# Patient Record
Sex: MALE | Race: WHITE | NOT HISPANIC OR LATINO | Employment: FULL TIME | URBAN - NONMETROPOLITAN AREA
[De-identification: names, ages, dates, MRNs, and addresses within clinical notes are randomized per-mention and may not be internally consistent; named-entity substitution may affect disease eponyms.]

---

## 2023-02-13 ENCOUNTER — OFFICE VISIT (OUTPATIENT)
Dept: FAMILY MEDICINE CLINIC | Facility: CLINIC | Age: 48
End: 2023-02-13
Payer: COMMERCIAL

## 2023-02-13 VITALS
BODY MASS INDEX: 26.4 KG/M2 | HEIGHT: 73 IN | OXYGEN SATURATION: 100 % | WEIGHT: 199.2 LBS | HEART RATE: 75 BPM | DIASTOLIC BLOOD PRESSURE: 82 MMHG | SYSTOLIC BLOOD PRESSURE: 130 MMHG

## 2023-02-13 DIAGNOSIS — Z00.00 ROUTINE GENERAL MEDICAL EXAMINATION AT A HEALTH CARE FACILITY: Primary | ICD-10-CM

## 2023-02-13 DIAGNOSIS — Z13.220 LIPID SCREENING: ICD-10-CM

## 2023-02-13 DIAGNOSIS — M25.571 ARTHRALGIA OF RIGHT FOOT: ICD-10-CM

## 2023-02-13 DIAGNOSIS — Z12.11 COLON CANCER SCREENING: ICD-10-CM

## 2023-02-13 DIAGNOSIS — Z12.5 PROSTATE CANCER SCREENING: ICD-10-CM

## 2023-02-13 PROCEDURE — 99386 PREV VISIT NEW AGE 40-64: CPT | Performed by: FAMILY MEDICINE

## 2023-02-13 PROCEDURE — 36415 COLL VENOUS BLD VENIPUNCTURE: CPT | Performed by: FAMILY MEDICINE

## 2023-02-13 NOTE — PROGRESS NOTES
"Chief Complaint  right toe pain and Annual Exam    Subjective          Josue Nieto presents to [facility]  History of Present Illness  Patient comes in today to establish care he needs a physical scan done he would like to have some blood work done also to get a colonoscopy and PSA as well.  He does report that he believes his grandfather had gout he says he been having periodic pain in his right great toe he says it comes and goes he says its not associated that it does not hurt him at night it is mostly when he moves his foot flexes his foot when he walks great distances start hurting he says it feels swollen and stiff at times and he says otherwise he has no other real shortness of breath chest pains or other difficulties      Objective   Vital Signs:   /82   Pulse 75   Ht 185.4 cm (73\")   Wt 90.4 kg (199 lb 3.2 oz)   SpO2 100%   BMI 26.28 kg/m²     Body mass index is 26.28 kg/m².    Review of Systems   Constitutional: Negative.    HENT: Negative for congestion, dental problem, ear discharge, ear pain and sore throat.    Respiratory: Negative for apnea, chest tightness and shortness of breath.    Gastrointestinal: Negative for constipation and nausea.   Endocrine: Negative for polyuria.   Genitourinary: Negative for difficulty urinating.   Musculoskeletal: Positive for arthralgias. Negative for gait problem.   Skin: Negative for rash.   Hematological: Negative for adenopathy.       Past History:  Medical History: has no past medical history on file.   Surgical History: has no past surgical history on file.       No current outpatient medications on file.    Allergies: Patient has no known allergies.    Physical Exam  Vitals reviewed.   Constitutional:       Appearance: Normal appearance.   HENT:      Head: Normocephalic.      Right Ear: Tympanic membrane, ear canal and external ear normal.      Left Ear: Tympanic membrane, ear canal and external ear normal.      Nose: " Nose normal.      Mouth/Throat:      Pharynx: Oropharynx is clear.   Eyes:      Pupils: Pupils are equal, round, and reactive to light.   Cardiovascular:      Rate and Rhythm: Normal rate and regular rhythm.      Pulses: Normal pulses.   Pulmonary:      Effort: Pulmonary effort is normal.      Breath sounds: Normal breath sounds.   Abdominal:      General: Abdomen is flat. Bowel sounds are normal.      Palpations: Abdomen is soft.   Musculoskeletal:         General: Normal range of motion.      Comments: Stiffness and slightly limited range of motion of right great toe   Skin:     General: Skin is warm and dry.   Neurological:      General: No focal deficit present.      Mental Status: He is alert and oriented to person, place, and time.          Result Review :                   Assessment and Plan    Diagnoses and all orders for this visit:    1. Routine general medical examination at a health care facility (Primary)  Comments:  Get blood work discussed diet and exercise  Orders:  -     CBC & Differential; Future  -     Comprehensive Metabolic Panel; Future  -     Comprehensive Metabolic Panel  -     CBC & Differential    2. Prostate cancer screening  Comments:  We will get PSA  Orders:  -     PSA Screen; Future  -     PSA Screen    3. Lipid screening  Comments:  We will get lipids  Orders:  -     Lipid Panel; Future  -     Lipid Panel    4. Colon cancer screening  Comments:  We will arrange colonoscopy  Orders:  -     Ambulatory Referral to Gastroenterology    5. Arthralgia of right foot  Comments:  We will get uric acid level discussed x-rays and shoe padding  Orders:  -     Uric Acid; Future  -     Uric Acid            Updated annual wellness visit checklist.  Immunizations discussed.  Screening up-to-date.  Recommend yearly dental and eye exams. Also discussed monitoring of blood pressure and lipids.    Follow Up   No follow-ups on file.  Patient was given instructions and counseling regarding his condition or  for health maintenance advice. Please see specific information pulled into the AVS if appropriate.     Geoffrey Murcia MD

## 2023-02-14 DIAGNOSIS — R79.89 ELEVATED LIVER FUNCTION TESTS: Primary | ICD-10-CM

## 2023-02-14 DIAGNOSIS — D69.6 THROMBOCYTOPENIA: ICD-10-CM

## 2023-02-14 LAB
ALBUMIN SERPL-MCNC: 4.8 G/DL (ref 4–5)
ALBUMIN/GLOB SERPL: 3 {RATIO} (ref 1.2–2.2)
ALP SERPL-CCNC: 66 IU/L (ref 44–121)
ALT SERPL-CCNC: 66 IU/L (ref 0–44)
AST SERPL-CCNC: 15 IU/L (ref 0–40)
BASOPHILS # BLD AUTO: 0 X10E3/UL (ref 0–0.2)
BASOPHILS NFR BLD AUTO: 1 %
BILIRUB SERPL-MCNC: 0.5 MG/DL (ref 0–1.2)
BUN SERPL-MCNC: 19 MG/DL (ref 6–24)
BUN/CREAT SERPL: 18 (ref 9–20)
CALCIUM SERPL-MCNC: 9.4 MG/DL (ref 8.7–10.2)
CHLORIDE SERPL-SCNC: 105 MMOL/L (ref 96–106)
CHOLEST SERPL-MCNC: 214 MG/DL (ref 100–199)
CO2 SERPL-SCNC: 24 MMOL/L (ref 20–29)
CREAT SERPL-MCNC: 1.03 MG/DL (ref 0.76–1.27)
EGFRCR SERPLBLD CKD-EPI 2021: 89 ML/MIN/1.73
EOSINOPHIL # BLD AUTO: 0.1 X10E3/UL (ref 0–0.4)
EOSINOPHIL NFR BLD AUTO: 2 %
ERYTHROCYTE [DISTWIDTH] IN BLOOD BY AUTOMATED COUNT: 12.5 % (ref 11.6–15.4)
GLOBULIN SER CALC-MCNC: 1.6 G/DL (ref 1.5–4.5)
GLUCOSE SERPL-MCNC: 74 MG/DL (ref 70–99)
HCT VFR BLD AUTO: 46 % (ref 37.5–51)
HDLC SERPL-MCNC: 32 MG/DL
HGB BLD-MCNC: 16.2 G/DL (ref 13–17.7)
IMM GRANULOCYTES # BLD AUTO: 0 X10E3/UL (ref 0–0.1)
IMM GRANULOCYTES NFR BLD AUTO: 0 %
LDLC SERPL CALC-MCNC: 115 MG/DL (ref 0–99)
LYMPHOCYTES # BLD AUTO: 1.4 X10E3/UL (ref 0.7–3.1)
LYMPHOCYTES NFR BLD AUTO: 29 %
MCH RBC QN AUTO: 31.6 PG (ref 26.6–33)
MCHC RBC AUTO-ENTMCNC: 35.2 G/DL (ref 31.5–35.7)
MCV RBC AUTO: 90 FL (ref 79–97)
MONOCYTES # BLD AUTO: 0.6 X10E3/UL (ref 0.1–0.9)
MONOCYTES NFR BLD AUTO: 11 %
NEUTROPHILS # BLD AUTO: 2.9 X10E3/UL (ref 1.4–7)
NEUTROPHILS NFR BLD AUTO: 57 %
PLATELET # BLD AUTO: 129 X10E3/UL (ref 150–450)
POTASSIUM SERPL-SCNC: 4 MMOL/L (ref 3.5–5.2)
PROT SERPL-MCNC: 6.4 G/DL (ref 6–8.5)
PSA SERPL-MCNC: 0.4 NG/ML (ref 0–4)
RBC # BLD AUTO: 5.13 X10E6/UL (ref 4.14–5.8)
SODIUM SERPL-SCNC: 141 MMOL/L (ref 134–144)
TRIGL SERPL-MCNC: 384 MG/DL (ref 0–149)
URATE SERPL-MCNC: 7 MG/DL (ref 3.8–8.4)
VLDLC SERPL CALC-MCNC: 67 MG/DL (ref 5–40)
WBC # BLD AUTO: 5 X10E3/UL (ref 3.4–10.8)

## 2023-03-06 RX ORDER — SODIUM, POTASSIUM,MAG SULFATES 17.5-3.13G
SOLUTION, RECONSTITUTED, ORAL ORAL
Qty: 354 ML | Refills: 0 | Status: SHIPPED | OUTPATIENT
Start: 2023-03-06

## 2023-03-20 ENCOUNTER — OUTSIDE FACILITY SERVICE (OUTPATIENT)
Dept: GASTROENTEROLOGY | Facility: CLINIC | Age: 48
End: 2023-03-20
Payer: COMMERCIAL

## 2023-03-20 PROCEDURE — 45385 COLONOSCOPY W/LESION REMOVAL: CPT | Performed by: INTERNAL MEDICINE

## 2023-05-16 ENCOUNTER — LAB (OUTPATIENT)
Dept: FAMILY MEDICINE CLINIC | Facility: CLINIC | Age: 48
End: 2023-05-16
Payer: COMMERCIAL

## 2023-05-16 DIAGNOSIS — D69.6 THROMBOCYTOPENIA: ICD-10-CM

## 2023-05-16 DIAGNOSIS — R79.89 ELEVATED LIVER FUNCTION TESTS: ICD-10-CM

## 2023-05-17 LAB
ALBUMIN SERPL-MCNC: 4.8 G/DL (ref 4–5)
ALBUMIN/GLOB SERPL: 2.7 {RATIO} (ref 1.2–2.2)
ALP SERPL-CCNC: 58 IU/L (ref 44–121)
ALT SERPL-CCNC: 43 IU/L (ref 0–44)
AST SERPL-CCNC: 10 IU/L (ref 0–40)
BASOPHILS # BLD AUTO: 0 X10E3/UL (ref 0–0.2)
BASOPHILS NFR BLD AUTO: 1 %
BILIRUB SERPL-MCNC: 0.5 MG/DL (ref 0–1.2)
BUN SERPL-MCNC: 12 MG/DL (ref 6–24)
BUN/CREAT SERPL: 12 (ref 9–20)
CALCIUM SERPL-MCNC: 9.6 MG/DL (ref 8.7–10.2)
CHLORIDE SERPL-SCNC: 105 MMOL/L (ref 96–106)
CO2 SERPL-SCNC: 24 MMOL/L (ref 20–29)
CREAT SERPL-MCNC: 1.02 MG/DL (ref 0.76–1.27)
EGFRCR SERPLBLD CKD-EPI 2021: 90 ML/MIN/1.73
EOSINOPHIL # BLD AUTO: 0.1 X10E3/UL (ref 0–0.4)
EOSINOPHIL NFR BLD AUTO: 3 %
ERYTHROCYTE [DISTWIDTH] IN BLOOD BY AUTOMATED COUNT: 12.6 % (ref 11.6–15.4)
GLOBULIN SER CALC-MCNC: 1.8 G/DL (ref 1.5–4.5)
GLUCOSE SERPL-MCNC: 85 MG/DL (ref 70–99)
HCT VFR BLD AUTO: 45.8 % (ref 37.5–51)
HGB BLD-MCNC: 15.6 G/DL (ref 13–17.7)
IMM GRANULOCYTES # BLD AUTO: 0 X10E3/UL (ref 0–0.1)
IMM GRANULOCYTES NFR BLD AUTO: 0 %
LYMPHOCYTES # BLD AUTO: 1.5 X10E3/UL (ref 0.7–3.1)
LYMPHOCYTES NFR BLD AUTO: 35 %
MCH RBC QN AUTO: 30.9 PG (ref 26.6–33)
MCHC RBC AUTO-ENTMCNC: 34.1 G/DL (ref 31.5–35.7)
MCV RBC AUTO: 91 FL (ref 79–97)
MONOCYTES # BLD AUTO: 0.4 X10E3/UL (ref 0.1–0.9)
MONOCYTES NFR BLD AUTO: 10 %
NEUTROPHILS # BLD AUTO: 2.2 X10E3/UL (ref 1.4–7)
NEUTROPHILS NFR BLD AUTO: 51 %
PLATELET # BLD AUTO: 132 X10E3/UL (ref 150–450)
POTASSIUM SERPL-SCNC: 4.4 MMOL/L (ref 3.5–5.2)
PROT SERPL-MCNC: 6.6 G/DL (ref 6–8.5)
RBC # BLD AUTO: 5.05 X10E6/UL (ref 4.14–5.8)
SODIUM SERPL-SCNC: 142 MMOL/L (ref 134–144)
WBC # BLD AUTO: 4.3 X10E3/UL (ref 3.4–10.8)

## 2023-05-18 DIAGNOSIS — D69.6 THROMBOCYTOPENIA: Primary | ICD-10-CM

## 2023-09-19 ENCOUNTER — CONSULT (OUTPATIENT)
Dept: ONCOLOGY | Facility: CLINIC | Age: 48
End: 2023-09-19
Payer: COMMERCIAL

## 2023-09-19 VITALS
HEART RATE: 60 BPM | BODY MASS INDEX: 26.11 KG/M2 | WEIGHT: 197 LBS | OXYGEN SATURATION: 99 % | DIASTOLIC BLOOD PRESSURE: 90 MMHG | HEIGHT: 73 IN | TEMPERATURE: 99 F | SYSTOLIC BLOOD PRESSURE: 147 MMHG | RESPIRATION RATE: 18 BRPM

## 2023-09-19 DIAGNOSIS — D69.6 THROMBOCYTOPENIA: Primary | ICD-10-CM

## 2023-09-19 PROCEDURE — 99204 OFFICE O/P NEW MOD 45 MIN: CPT | Performed by: INTERNAL MEDICINE

## 2023-09-19 NOTE — LETTER
September 19, 2023     Geoffrey Murcia MD  [address] [place] [address]    Patient: Josue Nieto   YOB: 1975   Date of Visit: 9/19/2023       Dear Geoffrey Murcia MD    Josue Nieto was in my office today. Below is a copy of my note.    If you have questions, please do not hesitate to call me. I look forward to following Josue along with you.         Sincerely,        Shreyas Zaidi MD        CC: No Recipients    CHIEF COMPLAINT: Isolated thrombocytopenia    REASON FOR REFERRAL: Isolated thrombocytopenia      RECORDS OBTAINED  Records of the patients history including those obtained from primary care were reviewed and summarized in detail.    Oncology/Hematology History Overview Note   1.  Thrombocytopenia    Hematology history timeline:  -2/13/2023 platelet count 129,000 otherwise unremarkable CBC and differential  -3/20/2023 colonoscopy showing tubular adenomatous polyp in sigmoid colon x3 with some high-grade dysplasia and 1 of those but no invasion identified.  -5/16/2023 platelets 132,000 otherwise unremarkable CBC and differential.    -9/19/2023 [facility] hematology consult: Patient with asymptomatic coincidental mild isolated thrombocytopenia with no associated viral prodromal symptoms and no associated autoimmune stigmata.  No petechiae or ecchymoses.   No unexplained fevers or chills.  Hemostatically stable with trauma of life.  No excessive bleeding at the time of polyp removal.  While I will do DIC panel, odds of that is low in this fit appearing male.  No palpable cervical axillary or inguinal adenopathy.  No hepatosplenomegaly.  No rashes.  No synovitis.  Could have pseudothrombocytopenia and we will check his peripheral smear and CBC with citrated tube.  Assuming all that is unremarkable and his creatinine and liver enzymes are normal, then the odds are that this is low level ITP for which he would need no treatment unless the platelets drop below 30,000.  In the absence of  "autoimmune stigmata I do not generally check for connective tissue disease serologies.     Thrombocytopenia       HISTORY OF PRESENT ILLNESS:  The patient is a 48 y.o.  male, referred for isolated thrombocytopenia without autoimmune stigmata and no evidence is clinically of hemostatic dysfunction    REVIEW OF SYSTEMS:  Other than some pain in his feet when he walks long distances no significant complaints    History reviewed. No pertinent past medical history.  No past surgical history on file.    Current Outpatient Medications on File Prior to Visit   Medication Sig Dispense Refill   • [DISCONTINUED] sodium-potassium-magnesium sulfates (Suprep Bowel Prep Kit) 17.5-3.13-1.6 GM/177ML solution oral solution Use as directed by provider for colonoscopy prep 354 mL 0     No current facility-administered medications on file prior to visit.       No Known Allergies    Social History     Socioeconomic History   • Marital status:    Tobacco Use   • Smoking status: Never   • Smokeless tobacco: Never   Substance and Sexual Activity   • Alcohol use: Yes   • Drug use: Never   • Sexual activity: Defer       Family History   Problem Relation Age of Onset   • Hypertension Mother    • Migraines Father    • Diabetes Paternal Grandmother    • Hyperlipidemia Paternal Grandmother    • Colon cancer Paternal Grandfather        PHYSICAL EXAM:  No palpable cervical axillary or inguinal adenopathy.  No palpable hepatosplenomegaly.  No jaundice or icterus or pallor.    /90   Pulse 60   Temp 99 °F (37.2 °C)   Resp 18   Ht 185.4 cm (73\")   Wt 89.4 kg (197 lb)   SpO2 99%   BMI 25.99 kg/m²     ECOG score: 0           ECOG: (0) Fully Active - Able to Carry On All Pre-disease Performance Without Restriction    Lab Results   Component Value Date    HGB 15.6 05/16/2023    HCT 45.8 05/16/2023    MCV 91 05/16/2023     (L) 05/16/2023    WBC 4.3 05/16/2023    NEUTROABS 2.2 05/16/2023    LYMPHSABS 1.5 05/16/2023    MONOSABS 0.4 " 05/16/2023    EOSABS 0.1 05/16/2023    BASOSABS 0.0 05/16/2023     Lab Results   Component Value Date    GLUCOSE 85 05/16/2023    BUN 12 05/16/2023    CREATININE 1.02 05/16/2023     05/16/2023    K 4.4 05/16/2023     05/16/2023    CO2 24 05/16/2023    CALCIUM 9.6 05/16/2023    ALBUMIN 4.8 05/16/2023    BILITOT 0.5 05/16/2023    ALKPHOS 58 05/16/2023    AST 10 05/16/2023    ALT 43 05/16/2023         Assessment & Plan     1.  Thrombocytopenia    Hematology history timeline:  -2/13/2023 platelet count 129,000 otherwise unremarkable CBC and differential  -3/20/2023 colonoscopy showing tubular adenomatous polyp in sigmoid colon x3 with some high-grade dysplasia and 1 of those but no invasion identified.  -5/16/2023 platelets 132,000 otherwise unremarkable CBC and differential.    -9/19/2023 [facility] hematology consult: Patient with asymptomatic coincidental mild isolated thrombocytopenia with no associated viral prodromal symptoms and no associated autoimmune stigmata.  No petechiae or ecchymoses.   No unexplained fevers or chills.  Hemostatically stable with trauma of life.  No excessive bleeding at the time of polyp removal.  While I will do DIC panel, odds of that is low in this fit appearing male.  No palpable cervical axillary or inguinal adenopathy.  No hepatosplenomegaly.  No rashes.  No synovitis.  Could have pseudothrombocytopenia and we will check his peripheral smear and CBC with citrated tube.  Assuming all that is unremarkable and his creatinine and liver enzymes are normal, then the odds are that this is low level ITP for which he would need no treatment unless the platelets drop below 30,000.  In the absence of autoimmune stigmata I do not generally check for connective tissue disease serologies.  He should not be at hemostatic risk at this level.    Total time of care today inclusive of time spent today prior to patient's arrival reviewing past available hematologic data and during visit going  over the broad differential diagnosis of thrombocytopenia and the work-up thereof and after visit instituting this plan 45 minutes of patient care time throughout the day today.  Translating that to patient going over the differential of isolated thrombocytopenia    Shreyas Zaidi MD    9/19/2023

## 2023-09-19 NOTE — PROGRESS NOTES
CHIEF COMPLAINT: Isolated thrombocytopenia    REASON FOR REFERRAL: Isolated thrombocytopenia      RECORDS OBTAINED  Records of the patients history including those obtained from primary care were reviewed and summarized in detail.    Oncology/Hematology History Overview Note   1.  Thrombocytopenia    Hematology history timeline:  -2/13/2023 platelet count 129,000 otherwise unremarkable CBC and differential  -3/20/2023 colonoscopy showing tubular adenomatous polyp in sigmoid colon x3 with some high-grade dysplasia and 1 of those but no invasion identified.  -5/16/2023 platelets 132,000 otherwise unremarkable CBC and differential.    -9/19/2023 [facility] hematology consult: Patient with asymptomatic coincidental mild isolated thrombocytopenia with no associated viral prodromal symptoms and no associated autoimmune stigmata.  No petechiae or ecchymoses.   No unexplained fevers or chills.  Hemostatically stable with trauma of life.  No excessive bleeding at the time of polyp removal.  While I will do DIC panel, odds of that is low in this fit appearing male.  No palpable cervical axillary or inguinal adenopathy.  No hepatosplenomegaly.  No rashes.  No synovitis.  Could have pseudothrombocytopenia and we will check his peripheral smear and CBC with citrated tube.  Assuming all that is unremarkable and his creatinine and liver enzymes are normal, then the odds are that this is low level ITP for which he would need no treatment unless the platelets drop below 30,000.  In the absence of autoimmune stigmata I do not generally check for connective tissue disease serologies.  He should not be at hemostatic risk at this level. He also has occasional dyspepsia and I asked him to talk to Dr. Beach about doing EGD which not only would screen for Mendoza's esophagus but could find H. pylori which, when treated, can sometimes normalize the platelet count.     Thrombocytopenia       HISTORY OF PRESENT ILLNESS:  The patient is a 48 y.o.  " male, referred for isolated thrombocytopenia without autoimmune stigmata and no evidence is clinically of hemostatic dysfunction    REVIEW OF SYSTEMS:  Other than some pain in his feet when he walks long distances no significant complaints    History reviewed. No pertinent past medical history.  No past surgical history on file.    Current Outpatient Medications on File Prior to Visit   Medication Sig Dispense Refill    [DISCONTINUED] sodium-potassium-magnesium sulfates (Suprep Bowel Prep Kit) 17.5-3.13-1.6 GM/177ML solution oral solution Use as directed by provider for colonoscopy prep 354 mL 0     No current facility-administered medications on file prior to visit.       No Known Allergies    Social History     Socioeconomic History    Marital status:    Tobacco Use    Smoking status: Never    Smokeless tobacco: Never   Substance and Sexual Activity    Alcohol use: Yes    Drug use: Never    Sexual activity: Defer       Family History   Problem Relation Age of Onset    Hypertension Mother     Migraines Father     Diabetes Paternal Grandmother     Hyperlipidemia Paternal Grandmother     Colon cancer Paternal Grandfather        PHYSICAL EXAM:  No palpable cervical axillary or inguinal adenopathy.  No palpable hepatosplenomegaly.  No jaundice or icterus or pallor.    /90   Pulse 60   Temp 99 °F (37.2 °C)   Resp 18   Ht 185.4 cm (73\")   Wt 89.4 kg (197 lb)   SpO2 99%   BMI 25.99 kg/m²     ECOG score: 0           ECOG: (0) Fully Active - Able to Carry On All Pre-disease Performance Without Restriction    Lab Results   Component Value Date    HGB 15.6 05/16/2023    HCT 45.8 05/16/2023    MCV 91 05/16/2023     (L) 05/16/2023    WBC 4.3 05/16/2023    NEUTROABS 2.2 05/16/2023    LYMPHSABS 1.5 05/16/2023    MONOSABS 0.4 05/16/2023    EOSABS 0.1 05/16/2023    BASOSABS 0.0 05/16/2023     Lab Results   Component Value Date    GLUCOSE 85 05/16/2023    BUN 12 05/16/2023    CREATININE 1.02 05/16/2023    "  05/16/2023    K 4.4 05/16/2023     05/16/2023    CO2 24 05/16/2023    CALCIUM 9.6 05/16/2023    ALBUMIN 4.8 05/16/2023    BILITOT 0.5 05/16/2023    ALKPHOS 58 05/16/2023    AST 10 05/16/2023    ALT 43 05/16/2023         Assessment & Plan     1.  Thrombocytopenia    Hematology history timeline:  -2/13/2023 platelet count 129,000 otherwise unremarkable CBC and differential  -3/20/2023 colonoscopy showing tubular adenomatous polyp in sigmoid colon x3 with some high-grade dysplasia and 1 of those but no invasion identified.  -5/16/2023 platelets 132,000 otherwise unremarkable CBC and differential.    -9/19/2023 [facility] consult: Patient with asymptomatic coincidental mild isolated thrombocytopenia with no associated viral prodromal symptoms and no associated autoimmune stigmata.  No petechiae or ecchymoses.   No unexplained fevers or chills.  Hemostatically stable with trauma of life.  No excessive bleeding at the time of polyp removal.  While I will do DIC panel, odds of that is low in this fit appearing male.  No palpable cervical axillary or inguinal adenopathy.  No hepatosplenomegaly.  No rashes.  No synovitis.  Could have pseudothrombocytopenia and we will check his peripheral smear and CBC with citrated tube.  Assuming all that is unremarkable and his creatinine and liver enzymes are normal, then the odds are that this is low level ITP for which he would need no treatment unless the platelets drop below 30,000.  In the absence of autoimmune stigmata I do not generally check for connective tissue disease serologies.  He should not be at hemostatic risk at this level.  He also has occasional dyspepsia and I asked him to talk to Dr. Beach about doing EGD which not only would screen for Mendoza's esophagus but could find H. pylori which, when treated, can sometimes normalize the platelet count.    Total time of care today inclusive of time spent today prior to patient's arrival reviewing past  available hematologic data and during visit going over the broad differential diagnosis of thrombocytopenia and the work-up thereof and after visit instituting this plan 45 minutes of patient care time throughout the day today.  Translating that to patient going over the differential of isolated thrombocytopenia    Shreyas Zaidi MD    9/19/2023

## 2023-10-24 ENCOUNTER — OFFICE VISIT (OUTPATIENT)
Dept: ONCOLOGY | Facility: CLINIC | Age: 48
End: 2023-10-24
Payer: COMMERCIAL

## 2023-10-24 VITALS
RESPIRATION RATE: 16 BRPM | DIASTOLIC BLOOD PRESSURE: 87 MMHG | HEART RATE: 60 BPM | BODY MASS INDEX: 26.77 KG/M2 | SYSTOLIC BLOOD PRESSURE: 138 MMHG | TEMPERATURE: 97.8 F | WEIGHT: 202 LBS | OXYGEN SATURATION: 98 % | HEIGHT: 73 IN

## 2023-10-24 DIAGNOSIS — D69.6 THROMBOCYTOPENIA: Primary | ICD-10-CM

## 2023-10-24 PROCEDURE — 99214 OFFICE O/P EST MOD 30 MIN: CPT | Performed by: INTERNAL MEDICINE

## 2023-10-24 NOTE — PROGRESS NOTES
CHIEF COMPLAINT: Mild thrombocytopenia    Problem List:  Oncology/Hematology History Overview Note   1.  Thrombocytopenia    Hematology history timeline:  -2/13/2023 platelet count 129,000 otherwise unremarkable CBC and differential  -3/20/2023 colonoscopy showing tubular adenomatous polyp in sigmoid colon x3 with some high-grade dysplasia and 1 of those but no invasion identified.  -5/16/2023 platelets 132,000 otherwise unremarkable CBC and differential.    -9/19/2023 [facility]ology consult: Patient with asymptomatic coincidental mild isolated thrombocytopenia with no associated viral prodromal symptoms and no associated autoimmune stigmata.  No petechiae or ecchymoses.   No unexplained fevers or chills.  Hemostatically stable with trauma of life.  No excessive bleeding at the time of polyp removal.  While I will do DIC panel, odds of that is low in this fit appearing male.  No palpable cervical axillary or inguinal adenopathy.  No hepatosplenomegaly.  No rashes.  No synovitis.  Could have pseudothrombocytopenia and we will check his peripheral smear and CBC with citrated tube.  Assuming all that is unremarkable and his creatinine and liver enzymes are normal, then the odds are that this is low level ITP for which he would need no treatment unless the platelets drop below 30,000.  In the absence of autoimmune stigmata I do not generally check for connective tissue disease serologies.  He should not be at hemostatic risk at this level. He also has occasional dyspepsia and I asked him to talk to Dr. Beach about doing EGD which not only would screen for Mendoza's esophagus but could find H. pylori which, when treated, can sometimes normalize the platelet count.     Thrombocytopenia       HISTORY OF PRESENT ILLNESS:  The patient is a 48 y.o. male, here for follow up on management of mild thrombocytopenia    History reviewed. No pertinent past medical history.  History reviewed. No pertinent surgical history.    No  "Known Allergies    Family History and Social History reviewed and changed as necessary    REVIEW OF SYSTEM:   No somatic complaints    PHYSICAL EXAM:  No petechiae or ecchymoses    Vitals:    10/24/23 1150   BP: 138/87   Pulse: 60   Resp: 16   Temp: 97.8 °F (36.6 °C)   SpO2: 98%   Weight: 91.6 kg (202 lb)   Height: 185.4 cm (73\")     Vitals:    10/24/23 1150   PainSc: 0-No pain          ECOG score: 0           Vitals reviewed.    ECOG: (0) Fully Active - Able to Carry On All Pre-disease Performance Without Restriction    Lab Results   Component Value Date    HGB 15.6 05/16/2023    HCT 45.8 05/16/2023    MCV 91 05/16/2023     (L) 05/16/2023    WBC 4.3 05/16/2023    NEUTROABS 2.2 05/16/2023    LYMPHSABS 1.5 05/16/2023    MONOSABS 0.4 05/16/2023    EOSABS 0.1 05/16/2023    BASOSABS 0.0 05/16/2023       Lab Results   Component Value Date    GLUCOSE 85 05/16/2023    BUN 12 05/16/2023    CREATININE 1.02 05/16/2023     05/16/2023    K 4.4 05/16/2023     05/16/2023    CO2 24 05/16/2023    CALCIUM 9.6 05/16/2023    ALBUMIN 4.8 05/16/2023    BILITOT 0.5 05/16/2023    ALKPHOS 58 05/16/2023    AST 10 05/16/2023    ALT 43 05/16/2023             ASSESSMENT & PLAN:  1.  Very mild thrombocytopenia: I reviewed his labs from September.  His platelets were 147,000 which were essentially normal and his D-dimer and fibrin split products and fibrinogen were all normal and he does not have DIC.  He had no platelet clumping I do not think this is pseudothrombocytopenia.  He may have very low level ITP and I will check his CBC in 3 months and just prior to return to my nurse practitioner in 6 months but if the platelets are staying well over 100,000 I would not need to follow him but would just ask Dr. Murcia to check blood count a couple times a year and as long as his platelets are staying over 30,000 I certainly would not treat with systemic therapies with prednisone etc.  If his platelets drop below 100,000 I be glad " to see him back to monitor but he has no signs or symptoms of other autoimmune disease or other conditions that would be associated with this.  Normal renal and liver functions as well and no microangiopathic changes on peripheral smear and no platelet clumping.  I reviewed all this in detail with the patient at the computer today and total time of care today discussing this was 30 minutes face-to-face  Shreyas Zaidi MD    10/24/2023

## 2024-04-04 ENCOUNTER — TELEPHONE (OUTPATIENT)
Dept: ONCOLOGY | Facility: CLINIC | Age: 49
End: 2024-04-04
Payer: COMMERCIAL

## 2024-04-04 NOTE — TELEPHONE ENCOUNTER
Patient called asking him where he would go to get labs drawn. Patient stating he will go to [facility] Fax #[phone]

## 2024-04-04 NOTE — TELEPHONE ENCOUNTER
Caller: Josue Nieto    Relationship: Self    Best call back number: [phone]    What is the best time to reach you: ANYTIME    Who are you requesting to speak with (clinical staff, provider,  specific staff member): CLINICAL    What was the call regarding: PATIENT HAS MISPLACED HIS LAB ORDERS FOR HIS UPCOMING APPT. PLEASE CALL TO ADVISE HOW HE CAN GET A NEW ORDER.

## 2024-04-05 ENCOUNTER — OFFICE VISIT (OUTPATIENT)
Dept: FAMILY MEDICINE CLINIC | Facility: CLINIC | Age: 49
End: 2024-04-05
Payer: COMMERCIAL

## 2024-04-05 VITALS
HEART RATE: 72 BPM | BODY MASS INDEX: 27.38 KG/M2 | SYSTOLIC BLOOD PRESSURE: 120 MMHG | HEIGHT: 73 IN | DIASTOLIC BLOOD PRESSURE: 82 MMHG | OXYGEN SATURATION: 96 % | WEIGHT: 206.6 LBS

## 2024-04-05 DIAGNOSIS — Z00.00 GENERAL MEDICAL EXAM: Primary | ICD-10-CM

## 2024-04-05 DIAGNOSIS — Z12.5 PROSTATE CANCER SCREENING: ICD-10-CM

## 2024-04-05 DIAGNOSIS — D69.6 THROMBOCYTOPENIA: ICD-10-CM

## 2024-04-05 DIAGNOSIS — Z71.89 ENCOUNTER FOR COUNSELING FOR SLEEP APNEA: ICD-10-CM

## 2024-04-05 DIAGNOSIS — Z11.59 NEED FOR HEPATITIS C SCREENING TEST: ICD-10-CM

## 2024-04-05 DIAGNOSIS — G47.30 ENCOUNTER FOR COUNSELING FOR SLEEP APNEA: ICD-10-CM

## 2024-04-05 DIAGNOSIS — R06.09 EXERTIONAL DYSPNEA: ICD-10-CM

## 2024-04-05 DIAGNOSIS — K21.9 GASTROESOPHAGEAL REFLUX DISEASE WITHOUT ESOPHAGITIS: ICD-10-CM

## 2024-04-05 DIAGNOSIS — E78.2 MIXED HYPERLIPIDEMIA: ICD-10-CM

## 2024-04-05 NOTE — ASSESSMENT & PLAN NOTE
He has had some mild exertional fatigue as well as a family history of high cholesterol and heart problems.  His EKG does not show any significant changes, but I would recommend that he see cardiology for further evaluation.  He is in agreement with this, so we will send a referral.

## 2024-04-05 NOTE — ASSESSMENT & PLAN NOTE
He has had significantly high cholesterol in the past, especially his VLDL.  It is possible that it has a genetic component.  We discussed the importance of low-cholesterol diet and exercise.  We will repeat levels on blood work when fasting, but I recommend that he consider statin therapy.

## 2024-04-05 NOTE — ASSESSMENT & PLAN NOTE
His episodes are not frequent enough that I think we need to start a PPI, but I did advise him to try something over-the-counter like famotidine as needed.  I also encouraged him to avoid eating late at night and to elevate the head of his bed.  Other food triggers attached to AVS.

## 2024-04-05 NOTE — PROGRESS NOTES
Male Physical Note      Date: 2024   Patient Name: Josue Nieto  : 1975   MRN: [id number]     Chief Complaint:    Chief Complaint   Patient presents with    Annual Exam       History of Present Illness: Josue Nieto is a 48 y.o. male who is here today for their annual health maintenance and physical.      He complains of intermittent epigastric pain through to his back every few weeks.  He states that it is not very common, but it usually happens after he has eaten spaghetti with red sauce.  He states that he is thinks it is related to his gallbladder, but he has not necessarily had problems with his gallbladder in the past.  He denies any nausea, vomiting, or diarrhea.  He states that he has taken an acids before, and he seemed to help.    He states that his mother has recently been having test on her heart, and the cardiologist recommended that her children be tested.  He is unsure what specific abnormalities they found.  He denies any chest pain, but he states he does have pressure at times.  He also admits that he has had increased dyspnea on exertion.    He states that his wife also wanted him to be checked for sleep apnea.  He states that she has never witnessed him stop breathing in his sleep, and he has not sure how much she snores.  He does not think that it is consistent, and it is not extremely loud.  He denies any daytime drowsiness, and he states that he usually sleeps fairly well.  He does not wake up frequently, and he has only been awakened from sleep gasping for air 1 time.  He admits that he had been out and had some alcohol prior to that.  He states that that has not happened any other time.    He is currently still being followed by hematology, and his next appointment on 24.  He states that he had blood work done earlier today for them, but they we will likely release him if everything is normal with these labs.    He has a history of abnormal colonoscopy, and his last  one was a year ago in March.  He states that they found multiple polyps, and he is scheduled to go for a repeat colonoscopy on 5/6/2024.    Subjective      Review of Systems:   Review of Systems   Constitutional:  Negative for activity change, appetite change, fatigue, unexpected weight gain and unexpected weight loss.   HENT:  Negative for congestion, ear pain, hearing loss, postnasal drip, rhinorrhea, sinus pressure, sneezing, sore throat, trouble swallowing and voice change.    Eyes:  Negative for pain and visual disturbance.   Respiratory:  Positive for shortness of breath (some increase in dyspnea with exertion). Negative for apnea, cough, chest tightness and wheezing.         (-) Snoring   Cardiovascular:  Negative for chest pain, palpitations and leg swelling.   Gastrointestinal:  Positive for abdominal pain (epigastric pain) and indigestion (heartburn with red sauce). Negative for abdominal distention, blood in stool, constipation, diarrhea, nausea, vomiting and GERD.   Endocrine: Negative for cold intolerance, heat intolerance, polydipsia and polyuria.   Musculoskeletal:  Negative for arthralgias and gait problem.   Skin: Negative.    Allergic/Immunologic: Negative for environmental allergies and food allergies.   Neurological:  Negative for dizziness, syncope, weakness, light-headedness, numbness, headache and confusion.   Hematological:  Negative for adenopathy. Does not bruise/bleed easily.   Psychiatric/Behavioral:  Negative for sleep disturbance.        Past Medical History, Social History, Family History and Care Team were all reviewed with patient and updated as appropriate.     Medications:   No current outpatient medications on file.    Allergies:   No Known Allergies    Health Maintenance   Topic Date Due    HEPATITIS C SCREENING  Never done    ZOSTER VACCINE (1 of 2) Never done    ANNUAL PHYSICAL  02/13/2024    LIPID PANEL  02/13/2024    BMI FOLLOWUP  04/05/2024 (Originally 1975)     "COLORECTAL CANCER SCREENING  05/06/2024 (Originally 1975)    INFLUENZA VACCINE  08/01/2024    TDAP/TD VACCINES (2 - Td or Tdap) 09/13/2028    COVID-19 Vaccine  Completed    Pneumococcal Vaccine 0-64  Aged Out        Diet/Physical activity:   He admits eating quite a bit of fast food, especially at lunch. He does not eat much fried food. He does not eat enough fruits and vegetables.    He is not currently exercising formally, but he has been trying to be more active in general.     Sexual health: Denies concerns.     Intimate partner violence: Denies concerns.      Objective     Physical Exam:  Vital Signs:   Vitals:    04/05/24 1109   BP: 120/82   BP Location: Left arm   Patient Position: Sitting   Cuff Size: Adult   Pulse: 72   SpO2: 96%   Weight: 93.7 kg (206 lb 9.6 oz)   Height: 185.4 cm (73\")     Body mass index is 27.26 kg/m².     Physical Exam  Vitals and nursing note reviewed.   Constitutional:       General: He is not in acute distress.     Appearance: Normal appearance. He is not ill-appearing.   HENT:      Head: Normocephalic and atraumatic.      Nose: Nose normal.   Eyes:      Pupils: Pupils are equal, round, and reactive to light.   Cardiovascular:      Rate and Rhythm: Normal rate and regular rhythm.      Pulses: Normal pulses.      Heart sounds: Normal heart sounds.   Pulmonary:      Effort: Pulmonary effort is normal. No respiratory distress.      Breath sounds: Normal breath sounds.   Abdominal:      General: Bowel sounds are normal.      Palpations: Abdomen is soft.      Tenderness: There is no abdominal tenderness. There is no guarding. Negative signs include Eldridge's sign.   Musculoskeletal:      Cervical back: Normal range of motion and neck supple.      Right lower leg: No edema.      Left lower leg: No edema.   Skin:     General: Skin is warm and dry.   Neurological:      General: No focal deficit present.      Mental Status: He is alert and oriented to person, place, and time.      Motor: " No weakness.      Coordination: Coordination normal.   Psychiatric:         Mood and Affect: Mood normal.         Behavior: Behavior normal.     PHQ-2 Depression Screening  Little interest or pleasure in doing things? 0-->not at all   Feeling down, depressed, or hopeless? 0-->not at all   PHQ-2 Total Score 0     Procedure     ECG 12 Lead    Date/Time: 4/5/2024 1:03 PM  Performed by: Sophie Reid PA-C    Authorized by: Sophie Reid PA-C  Comparison: not compared with previous ECG   Previous ECG: no previous ECG available  Rhythm: sinus rhythm  Rate: bradycardic         Assessment / Plan      Assessment/Plan:   Diagnoses and all orders for this visit:    1. General medical exam (Primary)  Comments:  Vaccinations and preventative screenings encouraged.  Orders:  -     Thyroid Cascade Profile; Future  -     Lipid Panel; Future  -     Hemoglobin A1c; Future  -     Comprehensive Metabolic Panel; Future  -     Apolipoprotein B; Future    2. Exertional dyspnea  Assessment & Plan:  He has had some mild exertional fatigue as well as a family history of high cholesterol and heart problems.  His EKG does not show any significant changes, but I would recommend that he see cardiology for further evaluation.  He is in agreement with this, so we will send a referral.    Orders:  -     ECG 12 Lead  -     Ambulatory Referral to Cardiology    3. Mixed hyperlipidemia  Assessment & Plan:  He has had significantly high cholesterol in the past, especially his VLDL.  It is possible that it has a genetic component.  We discussed the importance of low-cholesterol diet and exercise.  We will repeat levels on blood work when fasting, but I recommend that he consider statin therapy.      4. Gastroesophageal reflux disease without esophagitis  Assessment & Plan:  His episodes are not frequent enough that I think we need to start a PPI, but I did advise him to try something over-the-counter like famotidine as  needed.  I also encouraged him to avoid eating late at night and to elevate the head of his bed.  Other food triggers attached to AVS.      5. Thrombocytopenia  Assessment & Plan:  Continue with hematology as directed.      6. Encounter for counseling for sleep apnea  Assessment & Plan:  Given his lack of symptoms, I do think he is low risk for sleep apnea. The episode of gasping for air is likely due to his GERD rather than apnea. We can schedule with specialist for consultation, but he states that he will wait at this time.      7. Need for hepatitis C screening test  -     HCV Antibody Rfx To Qnt PCR; Future    8. Prostate cancer screening  -     PSA Screen; Future        Follow Up:   No follow-ups on file.    Healthcare Maintenance:   Discussed injury prevention, diet and exercise, safe sexual practices, and screening for common diseases. Encouraged use of sunscreen and seatbelts. Discussed timing of colon cancer cancer screening, prostate cancer screening, and review of skin for lesions. Avoidance of tobacco encouraged. Limitation or avoidance of alcohol encouraged. Recommend yearly dental and eye exams. Also discussed monitoring of blood pressure and lipids.   Josue Nieto voices understanding and acceptance of this advice and will call back with any further questions or concerns. AVS with preventive healthcare tips printed for patient.     Sophie Reid PA-C  [facility]

## 2024-04-05 NOTE — ASSESSMENT & PLAN NOTE
Given his lack of symptoms, I do think he is low risk for sleep apnea. The episode of gasping for air is likely due to his GERD rather than apnea. We can schedule with specialist for consultation, but he states that he will wait at this time.

## 2024-04-06 LAB
BASOPHILS # BLD AUTO: 0 X10E3/UL (ref 0–0.2)
BASOPHILS NFR BLD AUTO: 1 %
EOSINOPHIL # BLD AUTO: 0.1 X10E3/UL (ref 0–0.4)
EOSINOPHIL NFR BLD AUTO: 2 %
ERYTHROCYTE [DISTWIDTH] IN BLOOD BY AUTOMATED COUNT: 12.6 % (ref 11.6–15.4)
HCT VFR BLD AUTO: 47.8 % (ref 37.5–51)
HGB BLD-MCNC: 16.5 G/DL (ref 13–17.7)
IMM GRANULOCYTES # BLD AUTO: 0 X10E3/UL (ref 0–0.1)
IMM GRANULOCYTES NFR BLD AUTO: 1 %
LYMPHOCYTES # BLD AUTO: 1.4 X10E3/UL (ref 0.7–3.1)
LYMPHOCYTES NFR BLD AUTO: 34 %
MCH RBC QN AUTO: 31.8 PG (ref 26.6–33)
MCHC RBC AUTO-ENTMCNC: 34.5 G/DL (ref 31.5–35.7)
MCV RBC AUTO: 92 FL (ref 79–97)
MONOCYTES # BLD AUTO: 0.3 X10E3/UL (ref 0.1–0.9)
MONOCYTES NFR BLD AUTO: 8 %
NEUTROPHILS # BLD AUTO: 2.3 X10E3/UL (ref 1.4–7)
NEUTROPHILS NFR BLD AUTO: 54 %
PLATELET # BLD AUTO: 133 X10E3/UL (ref 150–450)
RBC # BLD AUTO: 5.19 X10E6/UL (ref 4.14–5.8)
WBC # BLD AUTO: 4.2 X10E3/UL (ref 3.4–10.8)

## 2024-04-09 ENCOUNTER — LAB (OUTPATIENT)
Dept: FAMILY MEDICINE CLINIC | Facility: CLINIC | Age: 49
End: 2024-04-09
Payer: COMMERCIAL

## 2024-04-09 DIAGNOSIS — Z12.5 PROSTATE CANCER SCREENING: ICD-10-CM

## 2024-04-09 DIAGNOSIS — Z11.59 NEED FOR HEPATITIS C SCREENING TEST: ICD-10-CM

## 2024-04-09 DIAGNOSIS — Z00.00 GENERAL MEDICAL EXAM: ICD-10-CM

## 2024-04-09 PROCEDURE — 36415 COLL VENOUS BLD VENIPUNCTURE: CPT | Performed by: PHYSICIAN ASSISTANT

## 2024-04-10 LAB
ALBUMIN SERPL-MCNC: 4.5 G/DL (ref 3.8–4.9)
ALBUMIN/GLOB SERPL: 2.1 {RATIO} (ref 1.2–2.2)
ALP SERPL-CCNC: 58 IU/L (ref 44–121)
ALT SERPL-CCNC: 44 IU/L (ref 0–44)
AST SERPL-CCNC: 8 IU/L (ref 0–40)
BILIRUB SERPL-MCNC: 0.5 MG/DL (ref 0–1.2)
BUN SERPL-MCNC: 15 MG/DL (ref 6–24)
BUN/CREAT SERPL: 13 (ref 9–20)
CALCIUM SERPL-MCNC: 9.7 MG/DL (ref 8.7–10.2)
CHLORIDE SERPL-SCNC: 105 MMOL/L (ref 96–106)
CHOLEST SERPL-MCNC: 237 MG/DL (ref 100–199)
CO2 SERPL-SCNC: 24 MMOL/L (ref 20–29)
CREAT SERPL-MCNC: 1.14 MG/DL (ref 0.76–1.27)
EGFRCR SERPLBLD CKD-EPI 2021: 78 ML/MIN/1.73
GLOBULIN SER CALC-MCNC: 2.1 G/DL (ref 1.5–4.5)
GLUCOSE SERPL-MCNC: 94 MG/DL (ref 70–99)
HBA1C MFR BLD: 5.1 % (ref 4.8–5.6)
HCV AB SERPL QL IA: NORMAL
HCV IGG SERPL QL IA: NON REACTIVE
HDLC SERPL-MCNC: 39 MG/DL
INTERPRETATION: NORMAL
LDLC SERPL CALC-MCNC: 161 MG/DL (ref 0–99)
POTASSIUM SERPL-SCNC: 4.3 MMOL/L (ref 3.5–5.2)
PROT SERPL-MCNC: 6.6 G/DL (ref 6–8.5)
PSA SERPL-MCNC: 0.3 NG/ML (ref 0–4)
SODIUM SERPL-SCNC: 143 MMOL/L (ref 134–144)
T4 FREE SERPL-MCNC: 1.01 NG/DL (ref 0.82–1.77)
THYROPEROXIDASE AB SERPL-ACNC: 9 IU/ML (ref 0–34)
TRIGL SERPL-MCNC: 202 MG/DL (ref 0–149)
TSH SERPL DL<=0.005 MIU/L-ACNC: 8.03 UIU/ML (ref 0.45–4.5)
VLDLC SERPL CALC-MCNC: 37 MG/DL (ref 5–40)

## 2024-04-11 LAB — APO B SERPL-MCNC: 134 MG/DL

## 2024-04-24 ENCOUNTER — OFFICE VISIT (OUTPATIENT)
Dept: CARDIOLOGY | Facility: CLINIC | Age: 49
End: 2024-04-24
Payer: COMMERCIAL

## 2024-04-24 ENCOUNTER — TELEPHONE (OUTPATIENT)
Dept: CARDIOLOGY | Facility: CLINIC | Age: 49
End: 2024-04-24

## 2024-04-24 VITALS
BODY MASS INDEX: 26.51 KG/M2 | RESPIRATION RATE: 18 BRPM | DIASTOLIC BLOOD PRESSURE: 72 MMHG | HEART RATE: 68 BPM | WEIGHT: 200 LBS | OXYGEN SATURATION: 96 % | SYSTOLIC BLOOD PRESSURE: 108 MMHG | HEIGHT: 73 IN

## 2024-04-24 DIAGNOSIS — E78.2 MIXED HYPERLIPIDEMIA: Primary | ICD-10-CM

## 2024-04-24 DIAGNOSIS — R06.09 EXERTIONAL DYSPNEA: ICD-10-CM

## 2024-04-24 PROCEDURE — 99204 OFFICE O/P NEW MOD 45 MIN: CPT | Performed by: INTERNAL MEDICINE

## 2024-04-24 RX ORDER — ROSUVASTATIN CALCIUM 5 MG/1
5 TABLET, COATED ORAL DAILY
Qty: 90 TABLET | Refills: 3 | Status: SHIPPED | OUTPATIENT
Start: 2024-04-24

## 2024-04-24 RX ORDER — SODIUM, POTASSIUM,MAG SULFATES 17.5-3.13G
SOLUTION, RECONSTITUTED, ORAL ORAL
Qty: 354 ML | Refills: 0 | Status: SHIPPED | OUTPATIENT
Start: 2024-04-24

## 2024-04-24 NOTE — ASSESSMENT & PLAN NOTE
Risk factors: Age, gender, family history of early onset CAD, hyperlipidemia.  Normal EKG.  No angina, possibly anginal equivalent.  Cardiovascular exam normal.   Plan:  Refer patient for a treadmill exercise stress test, and depending on findings and Sen score, consider additional workup (coronary calcium scoring, coronary angiogram, coronary CTA, other)  Start rosuvastatin 5 mg p.o. daily and monitor LFTs  Uptitrate to target LDL less than 70 at follow-up visits

## 2024-04-24 NOTE — PROGRESS NOTES
[facility] [place]  [facility]  [address] [place] [address]  Dept: [phone]  Dept Fax: [phone]    Date: 04/24/2024  Patient: Josue Nieto  YOB: 1975    New Patient Office Note    Consult Reason:  Mr. Josue Nieto is a 49 y.o. male who presents to the clinic to establish care, seen for Shortness of Breath (On exertion).   Patient complaining of dyspnea on exertion taking his trash container up a hill back to his house, no associated chest pain or lightheadedness.  Patient denies angina, orthopnea, PND, palpitations, lightheadedness, syncope or medications side-effects.  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  Works on his yard every day as exercise.  Strong family history of early onset CAD, stroke and need for pacemaker.        The following portions of the patient's history were reviewed and updated as appropriate: allergies, current medications, past family history, past medical history, past social history, past surgical history, and problem list.    Medications: No Known Allergies   Current Outpatient Medications   Medication Instructions    rosuvastatin (CRESTOR) 5 mg, Oral, Daily    sodium-potassium-magnesium sulfates (Suprep Bowel Prep Kit) 17.5-3.13-1.6 GM/177ML solution oral solution Use as directed by provider for colonoscopy prep       Subjective  History reviewed. No pertinent past medical history.    History reviewed. No pertinent surgical history.    Family History   Problem Relation Age of Onset    Hypertension Mother     Hyperlipidemia Mother     Coronary artery disease Mother     Migraines Father     Coronary artery disease Maternal Grandmother     Hyperlipidemia Maternal Grandmother     Colon cancer Maternal Grandfather     Diabetes Paternal Grandmother     Hyperlipidemia Paternal Grandmother     Colon cancer Paternal Grandfather     Heart attack Paternal Grandfather 60    Hyperlipidemia Sister         Social History  "    Socioeconomic History    Marital status:    Tobacco Use    Smoking status: Never    Smokeless tobacco: Never   Vaping Use    Vaping status: Never Used   Substance and Sexual Activity    Alcohol use: Yes    Drug use: Never    Sexual activity: Defer       Objective  Vitals:    04/24/24 0952   BP: 108/72   BP Location: Right arm   Patient Position: Sitting   Cuff Size: Adult   Pulse: 68   Resp: 18   SpO2: 96%   Weight: 90.7 kg (200 lb)   Height: 185.4 cm (73\")   PainSc: 0-No pain     Vitals:    04/24/24 0952   BP: 108/72   BP Location: Right arm   Patient Position: Sitting   Cuff Size: Adult   Pulse: 68   Resp: 18   SpO2: 96%   Weight: 90.7 kg (200 lb)   Height: 185.4 cm (73\")        Physical Exam  Constitutional:       Appearance: Healthy appearance. Not in distress.   Eyes:      Pupils: Pupils are equal, round, and reactive to light.   Neck:      Vascular: No JVR. JVD normal.   Pulmonary:      Effort: Pulmonary effort is normal.      Breath sounds: Normal breath sounds. No wheezing. No rhonchi. No rales.   Chest:      Chest wall: Not tender to palpatation.   Cardiovascular:      PMI at left midclavicular line. Normal rate. Regular rhythm. Normal S1 with normal intensity. Normal S2 with normal intensity.       Murmurs: There is no murmur.      No gallop.  No click. No rub.   Pulses:     Intact distal pulses.   Edema:     Peripheral edema absent.   Abdominal:      General: There is no abdominal bruit.   Skin:     General: Skin is warm.   Neurological:      Mental Status: Alert and oriented to person, place and time.              Labs:  Lab Results   Component Value Date     04/09/2024    K 4.3 04/09/2024     04/09/2024    CO2 24 04/09/2024    BUN 15 04/09/2024    CREATININE 1.14 04/09/2024    CALCIUM 9.7 04/09/2024    BILITOT 0.5 04/09/2024    ALKPHOS 58 04/09/2024    ALT 44 04/09/2024    AST 8 04/09/2024    GLUCOSE 94 04/09/2024    ALBUMIN 4.5 04/09/2024     Lab Results   Component Value Date    " "WBC 4.2 04/05/2024    HGB 16.5 04/05/2024    HCT 47.8 04/05/2024     (L) 04/05/2024     No results found for: \"APTT\", \"INR\", \"PTT\"  No results found for: \"CKTOTAL\", \"TROPONINI\", \"TROPONINT\", \"CKMBINDEX\", \"BNP\"  No results found for: \"BNP\", \"PROBNP\"    Lab Results   Component Value Date    CHLPL 237 (H) 04/09/2024    TRIG 202 (H) 04/09/2024    HDL 39 (L) 04/09/2024     (H) 04/09/2024     Lab Results   Component Value Date    TSH 8.030 (H) 04/09/2024    FREET4 1.01 04/09/2024       The 10-year ASCVD risk score (Perry HUERTAS, et al., 2019) is: 4.6%    Values used to calculate the score:      Age: 49 years      Sex: Male      Is Non- : No      Diabetic: No      Tobacco smoker: No      Systolic Blood Pressure: 108 mmHg      Is BP treated: No      HDL Cholesterol: 39 mg/dL      Total Cholesterol: 237 mg/dL     CV Diagnostics:  Procedures  EKG: EKG: normal sinus rhythm, sinus bradycardia.  CXR: No results found for this or any previous visit.     ECHO/MUGA: No results found for this or any previous visit.     STRESS TESTS: No results found for this or any previous visit.     CARDIAC CATH: No results found for this or any previous visit.     DEVICES: No valid procedures specified.   HOLTER: No results found for this or any previous visit.     CT/MRI:  No results found for this or any previous visit.    VASCULAR: No valid procedures specified.     Assessment and Plan  Diagnoses and all orders for this visit:    1. Mixed hyperlipidemia (Primary)  Assessment & Plan:   Lipid abnormalities are newly identified    Plan:  Begin taking the following medication/s; rosuvastatin 5 mg p.o. daily.      Discussed medication dosage, use, side effects, and goals of treatment in detail.    Counseled patient on lifestyle modifications to help control hyperlipidemia.   Cholesterol lowering dietary information shared with patient.  Advised patient to exercise for 150 minutes weekly. (30 minute brisk walk, 5 " days a week for example)  Weight Loss encouraged  Patient counseled in regards to heart healthy, low fat/ low cholesterol/low sat diet, daily exercise for 30 minutes, low to moderate intensity, and weight loss.    Patient Treatment Goals:   LDL goal is less than 70    Followup in 6 months.      2. Exertional dyspnea  Assessment & Plan:  Risk factors: Age, gender, family history of early onset CAD, hyperlipidemia.  Normal EKG.  No angina, possibly anginal equivalent.  Cardiovascular exam normal.   Plan:  Refer patient for a treadmill exercise stress test, and depending on findings and Sen score, consider additional workup (coronary calcium scoring, coronary angiogram, coronary CTA, other)  Start rosuvastatin 5 mg p.o. daily and monitor LFTs  Uptitrate to target LDL less than 70 at follow-up visits    Orders:  -     Treadmill Stress Test; Future  -     Comprehensive Metabolic Panel; Future    Other orders  -     rosuvastatin (CRESTOR) 5 MG tablet; Take 1 tablet by mouth Daily.  Dispense: 90 tablet; Refill: 3         Return in about 6 months (around 10/24/2024) for Next scheduled follow up, Follow-up with Dr Reyes.    There are no Patient Instructions on file for this visit.    Eddi Reyes MD

## 2024-04-24 NOTE — ASSESSMENT & PLAN NOTE
Lipid abnormalities are newly identified    Plan:  Begin taking the following medication/s; rosuvastatin 5 mg p.o. daily.      Discussed medication dosage, use, side effects, and goals of treatment in detail.    Counseled patient on lifestyle modifications to help control hyperlipidemia.   Cholesterol lowering dietary information shared with patient.  Advised patient to exercise for 150 minutes weekly. (30 minute brisk walk, 5 days a week for example)  Weight Loss encouraged  Patient counseled in regards to heart healthy, low fat/ low cholesterol/low sat diet, daily exercise for 30 minutes, low to moderate intensity, and weight loss.    Patient Treatment Goals:   LDL goal is less than 70    Followup in 6 months.

## 2024-05-01 ENCOUNTER — TELEPHONE (OUTPATIENT)
Dept: CARDIOLOGY | Facility: CLINIC | Age: 49
End: 2024-05-01
Payer: COMMERCIAL

## 2024-05-01 NOTE — TELEPHONE ENCOUNTER
----- Message from Seble PATIÑO sent at 4/30/2024  3:27 PM EDT -----  Please inform patient that his treadmill Stress Test was most likely normal.  He has a very good exercise capacity.  He had no EKG changes in the leads that were normal at baseline.  He had EKG changes in one of the leads that was abnormal at baseline, not suggesting ischemia, more due to baseline changes.  Test specificity limited by above finding.  Medical therapy for now.  If future concerns, can repeat test with imaging or coronary CT angiogram that looks directly at the rest of the heart.  Thank you!

## 2024-05-06 ENCOUNTER — OUTSIDE FACILITY SERVICE (OUTPATIENT)
Dept: GASTROENTEROLOGY | Facility: CLINIC | Age: 49
End: 2024-05-06
Payer: COMMERCIAL

## 2024-05-08 ENCOUNTER — OFFICE VISIT (OUTPATIENT)
Dept: ONCOLOGY | Facility: CLINIC | Age: 49
End: 2024-05-08
Payer: COMMERCIAL

## 2024-05-08 VITALS
RESPIRATION RATE: 18 BRPM | OXYGEN SATURATION: 98 % | HEIGHT: 73 IN | SYSTOLIC BLOOD PRESSURE: 136 MMHG | DIASTOLIC BLOOD PRESSURE: 87 MMHG | WEIGHT: 200 LBS | BODY MASS INDEX: 26.51 KG/M2 | HEART RATE: 64 BPM | TEMPERATURE: 97.3 F

## 2024-05-08 DIAGNOSIS — D69.6 THROMBOCYTOPENIA: Primary | ICD-10-CM

## 2024-05-08 PROCEDURE — 99213 OFFICE O/P EST LOW 20 MIN: CPT | Performed by: NURSE PRACTITIONER

## 2024-06-17 ENCOUNTER — OFFICE VISIT (OUTPATIENT)
Dept: FAMILY MEDICINE CLINIC | Facility: CLINIC | Age: 49
End: 2024-06-17
Payer: COMMERCIAL

## 2024-06-17 VITALS
SYSTOLIC BLOOD PRESSURE: 102 MMHG | OXYGEN SATURATION: 98 % | DIASTOLIC BLOOD PRESSURE: 70 MMHG | HEIGHT: 73 IN | WEIGHT: 198 LBS | HEART RATE: 72 BPM | BODY MASS INDEX: 26.24 KG/M2

## 2024-06-17 DIAGNOSIS — M25.532 ACUTE PAIN OF LEFT WRIST: Primary | ICD-10-CM

## 2024-06-17 PROCEDURE — 99213 OFFICE O/P EST LOW 20 MIN: CPT | Performed by: PHYSICIAN ASSISTANT

## 2024-06-18 NOTE — ASSESSMENT & PLAN NOTE
Provided patient with x-ray this date.  Recommended compression sleeve if x-ray is normal.  Then if no relief after a few weeks then advised patient he likely needs MRI and follow-up with orthopedics.  Provided patient with contact information and he will call and schedule his own appointment.

## 2024-06-18 NOTE — PROGRESS NOTES
"Chief Complaint  Wrist Pain (Left )    Subjective        Josue Nieto presents to [facility]  History of Present Illness  Patient reports today secondary to having left wrist pain for the past month.  Patient states he tripped and broke his fall with his left arm.  Reports since then he has had pain to his wrist.  Patient reports he has not taken any medication so far.  Denies any previous injuries.  Wrist Pain         Objective   Vital Signs:  /70   Pulse 72   Ht 185.4 cm (73\")   Wt 89.8 kg (198 lb)   SpO2 98%   BMI 26.12 kg/m²   Estimated body mass index is 26.12 kg/m² as calculated from the following:    Height as of this encounter: 185.4 cm (73\").    Weight as of this encounter: 89.8 kg (198 lb).             Physical Exam  Vitals and nursing note reviewed.   Constitutional:       General: He is not in acute distress.     Appearance: Normal appearance.   HENT:      Head: Normocephalic.      Right Ear: Hearing normal.      Left Ear: Hearing normal.   Eyes:      Pupils: Pupils are equal, round, and reactive to light.   Cardiovascular:      Rate and Rhythm: Normal rate and regular rhythm.   Pulmonary:      Effort: Pulmonary effort is normal. No respiratory distress.   Musculoskeletal:         General: Tenderness present.      Comments: No visible edema erythema.  Patient tender upon palpation to lateral surface of left wrist   Skin:     General: Skin is warm and dry.   Neurological:      Mental Status: He is alert.   Psychiatric:         Mood and Affect: Mood normal.        Result Review :                     Assessment and Plan     Diagnoses and all orders for this visit:    1. Acute pain of left wrist (Primary)  Assessment & Plan:  Provided patient with x-ray this date.  Recommended compression sleeve if x-ray is normal.  Then if no relief after a few weeks then advised patient he likely needs MRI and follow-up with orthopedics.  Provided patient with contact information " and he will call and schedule his own appointment.    Orders:  -     XR Wrist 3+ View Left; Future             Follow Up     Return if symptoms worsen or fail to improve.  Patient was given instructions and counseling regarding his condition or for health maintenance advice. Please see specific information pulled into the AVS if appropriate.

## 2024-06-19 DIAGNOSIS — M25.532 ACUTE PAIN OF LEFT WRIST: Primary | ICD-10-CM

## 2024-06-19 DIAGNOSIS — R93.6 ABNORMAL X-RAY OF HAND: ICD-10-CM

## 2024-10-24 ENCOUNTER — OFFICE VISIT (OUTPATIENT)
Dept: CARDIOLOGY | Facility: CLINIC | Age: 49
End: 2024-10-24
Payer: COMMERCIAL

## 2024-10-24 VITALS
WEIGHT: 196.8 LBS | OXYGEN SATURATION: 99 % | RESPIRATION RATE: 18 BRPM | SYSTOLIC BLOOD PRESSURE: 116 MMHG | HEART RATE: 55 BPM | DIASTOLIC BLOOD PRESSURE: 84 MMHG | BODY MASS INDEX: 26.08 KG/M2 | HEIGHT: 73 IN

## 2024-10-24 DIAGNOSIS — E78.2 MIXED HYPERLIPIDEMIA: Primary | ICD-10-CM

## 2024-10-24 DIAGNOSIS — R06.09 EXERTIONAL DYSPNEA: ICD-10-CM

## 2024-10-24 PROCEDURE — 99214 OFFICE O/P EST MOD 30 MIN: CPT | Performed by: INTERNAL MEDICINE

## 2024-10-24 RX ORDER — ROSUVASTATIN CALCIUM 10 MG/1
10 TABLET, COATED ORAL DAILY
Qty: 90 TABLET | Refills: 3 | Status: SHIPPED | OUTPATIENT
Start: 2024-10-24

## 2024-10-24 NOTE — ASSESSMENT & PLAN NOTE
Risk factors: Age, gender, family history of early onset CAD, hyperlipidemia.  Normal EKG.  No angina  Cardiovascular exam normal.  Treadmill stress test with excellent exercise capacity for age, negative for ischemia, nonspecific EKG changes during test.  Plan:  Uptitrate rosuvastatin 10 mg p.o. daily and monitor for side effects  Labs in 3 weeks

## 2024-10-24 NOTE — PROGRESS NOTES
"[facility]  [facility] CARDIOLOGY  [address] [place] [address]  Dept: [phone]  Dept Fax: [phone]    Date: 10/24/2024  Patient: Josue Nieto  YOB: 1975    Follow Up Office Visit Note    Interval Follow-up  Mr. Josue Nieto is a 49 y.o. male who is here for follow-up on mixed hyperlipidemia .    Subjective   Patient doing well with no complaints.  Patient denies angina, orthopnea, PND, palpitations, lightheadedness, syncope or medications side-effects.      The following portions of the patient's history were reviewed and updated as appropriate: allergies, current medications, past family history, past medical history, past social history, past surgical history, and problem list.    Medications: No Known Allergies   Current Outpatient Medications   Medication Instructions    rosuvastatin (CRESTOR) 10 mg, Oral, Daily       Tobacco Use: Low Risk  (10/24/2024)    Patient History     Smoking Tobacco Use: Never     Smokeless Tobacco Use: Never     Passive Exposure: Not on file        Objective  Vitals:    10/24/24 1029   BP: 116/84   Pulse: 55   Resp: 18   SpO2: 99%   Weight: 89.3 kg (196 lb 12.8 oz)   Height: 185.4 cm (73\")   PainSc: 0-No pain      Vitals:    10/24/24 1029   BP: 116/84   Pulse: 55   Resp: 18   SpO2: 99%   Weight: 89.3 kg (196 lb 12.8 oz)   Height: 185.4 cm (73\")          Physical Exam  Constitutional:       Appearance: Healthy appearance. Not in distress.   Eyes:      Pupils: Pupils are equal, round, and reactive to light.   Neck:      Vascular: No JVR. JVD normal.   Pulmonary:      Effort: Pulmonary effort is normal.      Breath sounds: Normal breath sounds. No wheezing. No rhonchi. No rales.   Chest:      Chest wall: Not tender to palpatation.   Cardiovascular:      PMI at left midclavicular line. Normal rate. Regular rhythm. Normal S1 with normal intensity. Normal S2 with normal intensity.       Murmurs: There is no murmur.      No " "gallop.  No click. No rub.   Pulses:     Intact distal pulses.   Edema:     Peripheral edema absent.   Abdominal:      General: There is no abdominal bruit.   Skin:     General: Skin is warm.   Neurological:      Mental Status: Alert and oriented to person, place and time.              Diagnostic Data  Lab Results   Component Value Date     04/09/2024    K 4.3 04/09/2024     04/09/2024    CO2 24 04/09/2024    BUN 15 04/09/2024    CREATININE 1.14 04/09/2024    CALCIUM 9.7 04/09/2024    BILITOT 0.5 04/09/2024    ALKPHOS 58 04/09/2024    ALT 44 04/09/2024    AST 8 04/09/2024    GLUCOSE 94 04/09/2024    ALBUMIN 4.5 04/09/2024     Lab Results   Component Value Date    WBC 4.2 04/05/2024    HGB 16.5 04/05/2024    HCT 47.8 04/05/2024     (L) 04/05/2024     No results found for: \"APTT\", \"INR\", \"PTT\"  No results found for: \"CKTOTAL\", \"TROPONINI\", \"TROPONINT\", \"CKMBINDEX\", \"BNP\"  No results found for: \"BNP\", \"PROBNP\"    Lab Results   Component Value Date    CHLPL 237 (H) 04/09/2024    TRIG 202 (H) 04/09/2024    HDL 39 (L) 04/09/2024     (H) 04/09/2024     Lab Results   Component Value Date    TSH 8.030 (H) 04/09/2024    FREET4 1.01 04/09/2024   The 10-year ASCVD risk score (Perry DK, et al., 2019) is: 5.2%    Values used to calculate the score:      Age: 49 years      Sex: Male      Is Non- : No      Diabetic: No      Tobacco smoker: No      Systolic Blood Pressure: 116 mmHg      Is BP treated: No      HDL Cholesterol: 39 mg/dL      Total Cholesterol: 237 mg/dL    CV Diagnostics:  Procedures    CXR: No results found for this or any previous visit.     ECHO/MUGA: No results found for this or any previous visit.     STRESS TESTS: Results for orders placed in visit on 04/30/24    Treadmill Stress Test    Interpretation Summary    Findings consistent with a probably normal ECG stress test, interpretation limited by baseline ST-T changes.    Overall very good exercise capacity " reaching 17.2 METS.    No ECG evidence of myocardial ischemia, interpretation limited by baseline ST-T changes in lead III/aVF worsened with exercise, returning to baseline and recovery.    Negative clinical evidence of myocardial ischemia.    Blood pressure demonstrated a hypertensive response to stress.     CARDIAC CATH: No results found for this or any previous visit.     DEVICES: No valid procedures specified.   HOLTER: No results found for this or any previous visit.     CT/MRI:  No results found for this or any previous visit.    VASCULAR: No valid procedures specified.     Assessment and Plan  Diagnoses and all orders for this visit:    1. Mixed hyperlipidemia (Primary)  Assessment & Plan:   Lipid abnormalities are improving with treatment    Plan:  Plan dosage change to the following medication/s;  rosuvastatin 10 mg p.o. daily.      Counseled patient on lifestyle modifications to help control hyperlipidemia.     Patient Treatment Goals:   LDL goal is less than 70    Followup in 6 months.    Orders:  -     TSH+Free T4; Future  -     Lipid Panel; Future  -     Comprehensive Metabolic Panel; Future    2. Exertional dyspnea  Assessment & Plan:  Risk factors: Age, gender, family history of early onset CAD, hyperlipidemia.  Normal EKG.  No angina  Cardiovascular exam normal.  Treadmill stress test with excellent exercise capacity for age, negative for ischemia, nonspecific EKG changes during test.  Plan:  Uptitrate rosuvastatin 10 mg p.o. daily and monitor for side effects  Labs in 3 weeks        Other orders  -     rosuvastatin (CRESTOR) 10 MG tablet; Take 1 tablet by mouth Daily.  Dispense: 90 tablet; Refill: 3         Return in about 6 months (around 4/24/2025) for Follow-up with Dr Reyes.    There are no Patient Instructions on file for this visit.    Eddi Reyes MD

## 2024-10-24 NOTE — ASSESSMENT & PLAN NOTE
Lipid abnormalities are improving with treatment    Plan:  Plan dosage change to the following medication/s;  rosuvastatin 10 mg p.o. daily.      Counseled patient on lifestyle modifications to help control hyperlipidemia.     Patient Treatment Goals:   LDL goal is less than 70    Followup in 6 months.

## 2024-11-14 ENCOUNTER — CLINICAL SUPPORT (OUTPATIENT)
Dept: CARDIOLOGY | Facility: CLINIC | Age: 49
End: 2024-11-14
Payer: COMMERCIAL

## 2024-11-14 DIAGNOSIS — D69.6 THROMBOCYTOPENIA: ICD-10-CM

## 2024-11-14 DIAGNOSIS — E78.2 MIXED HYPERLIPIDEMIA: Primary | ICD-10-CM

## 2024-11-14 DIAGNOSIS — R06.09 EXERTIONAL DYSPNEA: ICD-10-CM

## 2024-11-14 NOTE — PROGRESS NOTES
Venipuncture Blood Specimen Collection  Venipuncture performed in clinic by Sarai Herrera MA with good hemostasis. Patient tolerated the procedure well without complications.   11/14/24   Sarai Herrera MA

## 2024-11-15 LAB
ALBUMIN SERPL-MCNC: 4.6 G/DL (ref 3.8–4.9)
ALP SERPL-CCNC: 58 IU/L (ref 44–121)
ALT SERPL-CCNC: 57 IU/L (ref 0–44)
AST SERPL-CCNC: 12 IU/L (ref 0–40)
BILIRUB SERPL-MCNC: 0.7 MG/DL (ref 0–1.2)
BUN SERPL-MCNC: 12 MG/DL (ref 6–24)
BUN/CREAT SERPL: 10 (ref 9–20)
CALCIUM SERPL-MCNC: 9.6 MG/DL (ref 8.7–10.2)
CHLORIDE SERPL-SCNC: 106 MMOL/L (ref 96–106)
CHOLEST SERPL-MCNC: 138 MG/DL (ref 100–199)
CO2 SERPL-SCNC: 24 MMOL/L (ref 20–29)
CREAT SERPL-MCNC: 1.22 MG/DL (ref 0.76–1.27)
EGFRCR SERPLBLD CKD-EPI 2021: 72 ML/MIN/1.73
GLOBULIN SER CALC-MCNC: 1.7 G/DL (ref 1.5–4.5)
GLUCOSE SERPL-MCNC: 97 MG/DL (ref 70–99)
HDLC SERPL-MCNC: 37 MG/DL
LDLC SERPL CALC-MCNC: 76 MG/DL (ref 0–99)
POTASSIUM SERPL-SCNC: 4.6 MMOL/L (ref 3.5–5.2)
PROT SERPL-MCNC: 6.3 G/DL (ref 6–8.5)
SODIUM SERPL-SCNC: 143 MMOL/L (ref 134–144)
TRIGL SERPL-MCNC: 141 MG/DL (ref 0–149)
TSH SERPL DL<=0.005 MIU/L-ACNC: 6.46 UIU/ML (ref 0.45–4.5)
VLDLC SERPL CALC-MCNC: 25 MG/DL (ref 5–40)

## 2024-11-18 ENCOUNTER — TELEPHONE (OUTPATIENT)
Dept: CARDIOLOGY | Facility: CLINIC | Age: 49
End: 2024-11-18
Payer: COMMERCIAL

## 2024-11-18 NOTE — TELEPHONE ENCOUNTER
Patient notified of labs results, patient verbalized understanding.   Patient scheduled for follow up labs in 3 months

## 2024-11-18 NOTE — TELEPHONE ENCOUNTER
----- Message from Eddi Reyes sent at 11/17/2024  9:53 PM EST -----  Please inform patient that his cholesterol test has significantly improved. One liver test was borderline elevated, follow-up labs in 3 months.  Thank you!

## 2025-02-17 ENCOUNTER — CLINICAL SUPPORT (OUTPATIENT)
Dept: CARDIOLOGY | Facility: CLINIC | Age: 50
End: 2025-02-17
Payer: COMMERCIAL

## 2025-02-17 DIAGNOSIS — R94.5 LIVER FUNCTION STUDY, ABNORMAL: Primary | ICD-10-CM

## 2025-02-17 PROCEDURE — 36415 COLL VENOUS BLD VENIPUNCTURE: CPT | Performed by: INTERNAL MEDICINE

## 2025-02-17 NOTE — PROGRESS NOTES
Venipuncture Blood Specimen Collection  Venipuncture performed in clinic by Susan Hopson RN with good hemostasis. Patient tolerated the procedure well without complications.   02/17/25   Susan Hopson RN

## 2025-02-18 LAB
ALBUMIN SERPL-MCNC: 4.5 G/DL (ref 3.8–4.9)
ALP SERPL-CCNC: 58 IU/L (ref 44–121)
ALT SERPL-CCNC: 45 IU/L (ref 0–44)
AST SERPL-CCNC: 10 IU/L (ref 0–40)
BILIRUB SERPL-MCNC: 0.6 MG/DL (ref 0–1.2)
BUN SERPL-MCNC: 14 MG/DL (ref 6–24)
BUN/CREAT SERPL: 12 (ref 9–20)
CALCIUM SERPL-MCNC: 9.3 MG/DL (ref 8.7–10.2)
CHLORIDE SERPL-SCNC: 105 MMOL/L (ref 96–106)
CO2 SERPL-SCNC: 24 MMOL/L (ref 20–29)
CREAT SERPL-MCNC: 1.16 MG/DL (ref 0.76–1.27)
EGFRCR SERPLBLD CKD-EPI 2021: 76 ML/MIN/1.73
GLOBULIN SER CALC-MCNC: 1.7 G/DL (ref 1.5–4.5)
GLUCOSE SERPL-MCNC: 95 MG/DL (ref 70–99)
POTASSIUM SERPL-SCNC: 4 MMOL/L (ref 3.5–5.2)
PROT SERPL-MCNC: 6.2 G/DL (ref 6–8.5)
SODIUM SERPL-SCNC: 143 MMOL/L (ref 134–144)

## 2025-02-19 ENCOUNTER — TELEPHONE (OUTPATIENT)
Dept: CARDIOLOGY | Facility: CLINIC | Age: 50
End: 2025-02-19
Payer: COMMERCIAL

## 2025-02-19 NOTE — TELEPHONE ENCOUNTER
----- Message from Eddi Reyes sent at 2/18/2025  1:15 PM EST -----  Please inform patient that his Blood work was unchanged.   Thank you!

## 2025-04-24 ENCOUNTER — OFFICE VISIT (OUTPATIENT)
Dept: CARDIOLOGY | Facility: CLINIC | Age: 50
End: 2025-04-24
Payer: COMMERCIAL

## 2025-04-24 VITALS
WEIGHT: 201.4 LBS | SYSTOLIC BLOOD PRESSURE: 122 MMHG | OXYGEN SATURATION: 98 % | DIASTOLIC BLOOD PRESSURE: 64 MMHG | RESPIRATION RATE: 18 BRPM | HEART RATE: 68 BPM | BODY MASS INDEX: 26.69 KG/M2 | HEIGHT: 73 IN

## 2025-04-24 DIAGNOSIS — R06.09 EXERTIONAL DYSPNEA: Primary | ICD-10-CM

## 2025-04-24 DIAGNOSIS — E78.2 MIXED HYPERLIPIDEMIA: ICD-10-CM

## 2025-04-24 PROCEDURE — 99214 OFFICE O/P EST MOD 30 MIN: CPT | Performed by: INTERNAL MEDICINE

## 2025-04-24 NOTE — ASSESSMENT & PLAN NOTE
Patient doing well with no symptoms currently.  Risk factors: Age, gender, family history of early onset CAD, hyperlipidemia.  Normal EKG.  No angina  Cardiovascular exam normal.  Treadmill stress test with excellent exercise capacity for age, negative for ischemia, nonspecific EKG changes during test.  Lipid profile to goal.  LFTs stable, unlikely related to statin therapy.  Plan:  Continue rosuvastatin 10 mg p.o. daily and monitor for side effects  Follow-up clinically

## 2025-04-24 NOTE — ASSESSMENT & PLAN NOTE
Lipid abnormalities are improving with treatment     Plan:  Continue rosuvastatin 10 mg p.o. daily.       Counseled patient on lifestyle modifications to help control hyperlipidemia.   Lab Results   Component Value Date    LDL 76 11/14/2024     (H) 04/09/2024    HDL 37 (L) 11/14/2024    HDL 39 (L) 04/09/2024    CHLPL 138 11/14/2024    CHLPL 237 (H) 04/09/2024    TRIG 141 11/14/2024    TRIG 202 (H) 04/09/2024        Patient Treatment Goals:   LDL goal is less than 70; at goal     Followup in 1 year

## 2025-04-24 NOTE — PROGRESS NOTES
"[facility]  [facility] CARDIOLOGY  [address] [place] [address]  Dept: [phone]  Dept Fax: [phone]    Date: 04/24/2025  Patient: Josue Nieto  YOB: 1975    Follow Up Office Visit Note    Interval Follow-up  Mr. Josue Nieto is a 50 y.o. male who is here for follow-up on No chief complaint on file..    Subjective   Patient doing well with no complaints.  Patient denies angina, orthopnea, PND, palpitations, lightheadedness, syncope or medications side-effects.    The following portions of the patient's history were reviewed and updated as appropriate: allergies, current medications, past family history, past medical history, past social history, past surgical history, and problem list.    Medications: No Known Allergies   Current Outpatient Medications   Medication Instructions    rosuvastatin (CRESTOR) 10 mg, Oral, Daily       Tobacco Use: Low Risk  (4/24/2025)    Patient History     Smoking Tobacco Use: Never     Smokeless Tobacco Use: Never     Passive Exposure: Not on file        Objective  Vitals:    04/24/25 0929   BP: 122/64   Pulse: 68   Resp: 18   SpO2: 98%   Weight: 91.4 kg (201 lb 6.4 oz)   Height: 185.4 cm (73\")   PainSc: 0-No pain      Vitals:    04/24/25 0929   BP: 122/64   Pulse: 68   Resp: 18   SpO2: 98%   Weight: 91.4 kg (201 lb 6.4 oz)   Height: 185.4 cm (73\")          Physical Exam  Constitutional:       Appearance: Healthy appearance. Not in distress.   Eyes:      Pupils: Pupils are equal, round, and reactive to light.   Neck:      Vascular: No JVR. JVD normal.   Pulmonary:      Effort: Pulmonary effort is normal.      Breath sounds: Normal breath sounds. No wheezing. No rhonchi. No rales.   Chest:      Chest wall: Not tender to palpatation.   Cardiovascular:      PMI at left midclavicular line. Normal rate. Regular rhythm. Normal S1 with normal intensity. Normal S2 with normal intensity.       Murmurs: There is no murmur.      No " "gallop.  No click. No rub.   Pulses:     Intact distal pulses.   Edema:     Peripheral edema absent.   Abdominal:      General: There is no abdominal bruit.   Skin:     General: Skin is warm.   Neurological:      Mental Status: Alert and oriented to person, place and time.              Diagnostic Data  Lab Results   Component Value Date     02/17/2025    K 4.0 02/17/2025     02/17/2025    CO2 24 02/17/2025    BUN 14 02/17/2025    CREATININE 1.16 02/17/2025    CALCIUM 9.3 02/17/2025    BILITOT 0.6 02/17/2025    ALKPHOS 58 02/17/2025    ALT 45 (H) 02/17/2025    AST 10 02/17/2025    GLUCOSE 95 02/17/2025    ALBUMIN 4.5 02/17/2025     Lab Results   Component Value Date    WBC 4.2 04/05/2024    HGB 16.5 04/05/2024    HCT 47.8 04/05/2024     (L) 04/05/2024     No results found for: \"APTT\", \"INR\", \"PTT\"  No results found for: \"CKTOTAL\", \"TROPONINI\", \"TROPONINT\", \"CKMBINDEX\", \"BNP\"  No results found for: \"BNP\", \"PROBNP\"    Lab Results   Component Value Date    CHLPL 138 11/14/2024    TRIG 141 11/14/2024    HDL 37 (L) 11/14/2024    LDL 76 11/14/2024     Lab Results   Component Value Date    TSH 6.460 (H) 11/14/2024    FREET4 1.01 04/09/2024       CV Diagnostics:  Procedures  CXR: No results found for this or any previous visit.     ECHO/MUGA: No results found for this or any previous visit.     STRESS TESTS: Results for orders placed in visit on 04/30/24    Treadmill Stress Test    Interpretation Summary    Findings consistent with a probably normal ECG stress test, interpretation limited by baseline ST-T changes.    Overall very good exercise capacity reaching 17.2 METS.    No ECG evidence of myocardial ischemia, interpretation limited by baseline ST-T changes in lead III/aVF worsened with exercise, returning to baseline and recovery.    Negative clinical evidence of myocardial ischemia.    Blood pressure demonstrated a hypertensive response to stress.     CARDIAC CATH: No results found for this or any " previous visit.     DEVICES: No valid procedures specified.   HOLTER: No results found for this or any previous visit.     CT/MRI:  No results found for this or any previous visit.    VASCULAR: No valid procedures specified.     Assessment and Plan  Diagnoses and all orders for this visit:    1. Exertional dyspnea (Primary)  Assessment & Plan:  Patient doing well with no symptoms currently.  Risk factors: Age, gender, family history of early onset CAD, hyperlipidemia.  Normal EKG.  No angina  Cardiovascular exam normal.  Treadmill stress test with excellent exercise capacity for age, negative for ischemia, nonspecific EKG changes during test.  Lipid profile to goal.  LFTs stable, unlikely related to statin therapy.  Plan:  Continue rosuvastatin 10 mg p.o. daily and monitor for side effects  Follow-up clinically      2. Mixed hyperlipidemia         Return in about 1 year (around 4/24/2026) for Follow-up with Dr Reyes.    There are no Patient Instructions on file for this visit.    Eddi Reyes MD